# Patient Record
Sex: MALE | Race: WHITE | HISPANIC OR LATINO | URBAN - NONMETROPOLITAN AREA
[De-identification: names, ages, dates, MRNs, and addresses within clinical notes are randomized per-mention and may not be internally consistent; named-entity substitution may affect disease eponyms.]

---

## 2021-01-08 ENCOUNTER — OFFICE VISIT (OUTPATIENT)
Dept: URBAN - NONMETROPOLITAN AREA CLINIC 18 | Facility: CLINIC | Age: 21
End: 2021-01-08
Payer: COMMERCIAL

## 2021-01-08 DIAGNOSIS — E11.9 TYPE 2 DIABETES MELLITUS W/O COMPLICATION: Chronic | ICD-10-CM

## 2021-01-08 DIAGNOSIS — H52.223 REGULAR ASTIGMATISM, BILATERAL: Primary | Chronic | ICD-10-CM

## 2021-01-08 PROCEDURE — 92004 COMPRE OPH EXAM NEW PT 1/>: CPT | Performed by: OPTOMETRIST

## 2021-01-08 ASSESSMENT — VISUAL ACUITY
OS: 20/25
OD: 20/20

## 2021-01-08 ASSESSMENT — INTRAOCULAR PRESSURE
OS: 14
OD: 13

## 2021-01-08 ASSESSMENT — KERATOMETRY
OD: 42.88
OS: 42.63

## 2021-01-08 NOTE — IMPRESSION/PLAN
Impression: Type 2 diabetes mellitus w/o complication: N84.0. Plan: Reviewed with patient that no retinal vascular changes are occurring from diabetes. Patient to continue care with current medication provider for diabetes management. Importance of retinal exams reviewed. Will continue to observe with dilated examination in 12 months.